# Patient Record
Sex: MALE | URBAN - METROPOLITAN AREA
[De-identification: names, ages, dates, MRNs, and addresses within clinical notes are randomized per-mention and may not be internally consistent; named-entity substitution may affect disease eponyms.]

---

## 2023-02-27 ENCOUNTER — OFFICE (OUTPATIENT)
Dept: URBAN - METROPOLITAN AREA CLINIC 27 | Facility: CLINIC | Age: 23
End: 2023-02-27

## 2023-02-27 VITALS
DIASTOLIC BLOOD PRESSURE: 77 MMHG | WEIGHT: 201 LBS | HEIGHT: 67 IN | TEMPERATURE: 98.1 F | SYSTOLIC BLOOD PRESSURE: 129 MMHG | HEART RATE: 75 BPM

## 2023-02-27 DIAGNOSIS — Z83.79 FAMILY HISTORY OF OTHER DISEASES OF THE DIGESTIVE SYSTEM: ICD-10-CM

## 2023-02-27 DIAGNOSIS — R19.4 CHANGE IN BOWEL HABIT: ICD-10-CM

## 2023-02-27 DIAGNOSIS — K92.0 HEMATEMESIS: ICD-10-CM

## 2023-02-27 DIAGNOSIS — K21.9 GASTRO-ESOPHAGEAL REFLUX DISEASE WITHOUT ESOPHAGITIS: ICD-10-CM

## 2023-02-27 DIAGNOSIS — K92.1 MELENA: ICD-10-CM

## 2023-02-27 PROCEDURE — 99244 OFF/OP CNSLTJ NEW/EST MOD 40: CPT | Performed by: INTERNAL MEDICINE

## 2023-03-24 PROBLEM — F41.1 GENERALIZED ANXIETY DISORDER: Status: ACTIVE | Noted: 2023-03-24

## 2023-03-24 PROBLEM — R30.0 DYSURIA: Status: ACTIVE | Noted: 2023-03-24

## 2023-03-24 PROBLEM — R00.2 PALPITATIONS: Status: ACTIVE | Noted: 2023-03-24

## 2023-03-24 PROBLEM — L65.9 HAIR LOSS: Status: ACTIVE | Noted: 2023-03-24

## 2023-03-24 PROBLEM — L30.9 ECZEMA: Status: ACTIVE | Noted: 2023-03-24

## 2023-03-24 PROBLEM — G47.00 INSOMNIA, PERSISTENT: Status: ACTIVE | Noted: 2023-03-24

## 2023-03-24 PROBLEM — H61.22 IMPACTED CERUMEN OF LEFT EAR: Status: ACTIVE | Noted: 2023-03-24

## 2023-03-24 PROBLEM — R53.83 FATIGUE: Status: ACTIVE | Noted: 2023-03-24

## 2023-03-24 PROBLEM — J02.9 SORE THROAT: Status: ACTIVE | Noted: 2023-03-24

## 2023-03-24 PROBLEM — R00.0 TACHYCARDIA: Status: ACTIVE | Noted: 2023-03-24

## 2023-03-24 PROBLEM — J30.9 ALLERGIC RHINITIS: Status: ACTIVE | Noted: 2023-03-24

## 2023-03-24 PROBLEM — R51.9 HEADACHE: Status: ACTIVE | Noted: 2023-03-24

## 2023-03-24 PROBLEM — K21.9 GERD (GASTROESOPHAGEAL REFLUX DISEASE): Status: ACTIVE | Noted: 2023-03-24

## 2023-03-24 PROBLEM — G47.8 SLEEP PARALYSIS: Status: ACTIVE | Noted: 2023-03-24

## 2023-03-24 PROBLEM — N34.2 URETHRITIS: Status: ACTIVE | Noted: 2023-03-24

## 2023-03-24 PROBLEM — J01.10 ACUTE FRONTAL SINUSITIS: Status: ACTIVE | Noted: 2023-03-24

## 2023-03-24 PROBLEM — L73.9 FOLLICULITIS: Status: ACTIVE | Noted: 2023-03-24

## 2023-03-24 PROBLEM — F22 PARANOIA (MULTI): Status: ACTIVE | Noted: 2023-03-24

## 2023-03-24 PROBLEM — G47.9 SLEEP DISORDER: Status: ACTIVE | Noted: 2023-03-24

## 2023-03-24 PROBLEM — J35.8 TONSIL STONE: Status: ACTIVE | Noted: 2023-03-24

## 2023-03-24 PROBLEM — T78.40XA ALLERGIC REACTION: Status: ACTIVE | Noted: 2023-03-24

## 2023-03-24 PROBLEM — R59.9 REACTIVE LYMPHADENOPATHY: Status: ACTIVE | Noted: 2023-03-24

## 2023-03-24 PROBLEM — K13.79 MOUTH PAIN: Status: ACTIVE | Noted: 2023-03-24

## 2023-03-24 RX ORDER — ESCITALOPRAM OXALATE 10 MG/1
1 TABLET ORAL DAILY
COMMUNITY
Start: 2023-02-16 | End: 2023-04-10

## 2023-03-24 RX ORDER — LORATADINE 10 MG/1
1 TABLET ORAL DAILY PRN
COMMUNITY
Start: 2021-06-09

## 2023-03-24 RX ORDER — OMEPRAZOLE 40 MG/1
1 CAPSULE, DELAYED RELEASE ORAL DAILY
COMMUNITY
Start: 2023-02-16 | End: 2023-06-27 | Stop reason: DRUGHIGH

## 2023-04-05 DIAGNOSIS — F41.1 GENERALIZED ANXIETY DISORDER: ICD-10-CM

## 2023-04-07 ENCOUNTER — AMBULATORY SURGICAL CENTER (OUTPATIENT)
Dept: URBAN - METROPOLITAN AREA SURGERY 12 | Facility: SURGERY | Age: 23
End: 2023-04-07

## 2023-04-07 ENCOUNTER — OFFICE (OUTPATIENT)
Dept: URBAN - METROPOLITAN AREA PATHOLOGY 2 | Facility: PATHOLOGY | Age: 23
End: 2023-04-07
Payer: COMMERCIAL

## 2023-04-07 VITALS
DIASTOLIC BLOOD PRESSURE: 58 MMHG | HEIGHT: 67 IN | HEART RATE: 91 BPM | SYSTOLIC BLOOD PRESSURE: 146 MMHG | OXYGEN SATURATION: 97 % | RESPIRATION RATE: 17 BRPM | HEART RATE: 101 BPM | DIASTOLIC BLOOD PRESSURE: 55 MMHG | SYSTOLIC BLOOD PRESSURE: 130 MMHG | SYSTOLIC BLOOD PRESSURE: 126 MMHG | OXYGEN SATURATION: 99 % | OXYGEN SATURATION: 95 % | HEART RATE: 81 BPM | HEART RATE: 101 BPM | SYSTOLIC BLOOD PRESSURE: 125 MMHG | HEART RATE: 86 BPM | RESPIRATION RATE: 17 BRPM | DIASTOLIC BLOOD PRESSURE: 59 MMHG | RESPIRATION RATE: 12 BRPM | RESPIRATION RATE: 12 BRPM | HEART RATE: 85 BPM | OXYGEN SATURATION: 99 % | DIASTOLIC BLOOD PRESSURE: 55 MMHG | WEIGHT: 200 LBS | OXYGEN SATURATION: 97 % | HEART RATE: 68 BPM | HEART RATE: 72 BPM | HEART RATE: 81 BPM | WEIGHT: 200 LBS | HEART RATE: 86 BPM | TEMPERATURE: 98.5 F | SYSTOLIC BLOOD PRESSURE: 125 MMHG | DIASTOLIC BLOOD PRESSURE: 59 MMHG | RESPIRATION RATE: 12 BRPM | HEART RATE: 81 BPM | RESPIRATION RATE: 15 BRPM | SYSTOLIC BLOOD PRESSURE: 130 MMHG | HEIGHT: 67 IN | HEART RATE: 83 BPM | DIASTOLIC BLOOD PRESSURE: 69 MMHG | DIASTOLIC BLOOD PRESSURE: 69 MMHG | HEART RATE: 83 BPM | HEART RATE: 72 BPM | DIASTOLIC BLOOD PRESSURE: 58 MMHG | RESPIRATION RATE: 16 BRPM | RESPIRATION RATE: 18 BRPM | RESPIRATION RATE: 18 BRPM | RESPIRATION RATE: 15 BRPM | OXYGEN SATURATION: 100 % | SYSTOLIC BLOOD PRESSURE: 108 MMHG | HEART RATE: 68 BPM | OXYGEN SATURATION: 100 % | HEART RATE: 86 BPM | OXYGEN SATURATION: 93 % | OXYGEN SATURATION: 99 % | HEART RATE: 91 BPM | SYSTOLIC BLOOD PRESSURE: 146 MMHG | RESPIRATION RATE: 16 BRPM | DIASTOLIC BLOOD PRESSURE: 69 MMHG | DIASTOLIC BLOOD PRESSURE: 49 MMHG | RESPIRATION RATE: 17 BRPM | OXYGEN SATURATION: 100 % | DIASTOLIC BLOOD PRESSURE: 58 MMHG | TEMPERATURE: 98.5 F | HEART RATE: 77 BPM | HEART RATE: 83 BPM | HEART RATE: 77 BPM | HEART RATE: 101 BPM | DIASTOLIC BLOOD PRESSURE: 71 MMHG | TEMPERATURE: 98.5 F | SYSTOLIC BLOOD PRESSURE: 134 MMHG | SYSTOLIC BLOOD PRESSURE: 134 MMHG | RESPIRATION RATE: 15 BRPM | OXYGEN SATURATION: 95 % | SYSTOLIC BLOOD PRESSURE: 108 MMHG | HEIGHT: 67 IN | OXYGEN SATURATION: 93 % | HEART RATE: 77 BPM | RESPIRATION RATE: 16 BRPM | SYSTOLIC BLOOD PRESSURE: 108 MMHG | SYSTOLIC BLOOD PRESSURE: 130 MMHG | SYSTOLIC BLOOD PRESSURE: 126 MMHG | WEIGHT: 200 LBS | DIASTOLIC BLOOD PRESSURE: 49 MMHG | RESPIRATION RATE: 18 BRPM | OXYGEN SATURATION: 93 % | DIASTOLIC BLOOD PRESSURE: 55 MMHG | RESPIRATION RATE: 14 BRPM | OXYGEN SATURATION: 97 % | HEART RATE: 72 BPM | SYSTOLIC BLOOD PRESSURE: 125 MMHG | RESPIRATION RATE: 14 BRPM | DIASTOLIC BLOOD PRESSURE: 71 MMHG | DIASTOLIC BLOOD PRESSURE: 59 MMHG | HEART RATE: 85 BPM | DIASTOLIC BLOOD PRESSURE: 49 MMHG | HEART RATE: 68 BPM | SYSTOLIC BLOOD PRESSURE: 146 MMHG | OXYGEN SATURATION: 95 % | HEART RATE: 85 BPM | HEART RATE: 91 BPM | DIASTOLIC BLOOD PRESSURE: 71 MMHG | SYSTOLIC BLOOD PRESSURE: 134 MMHG | RESPIRATION RATE: 14 BRPM | SYSTOLIC BLOOD PRESSURE: 126 MMHG

## 2023-04-07 DIAGNOSIS — K44.9 DIAPHRAGMATIC HERNIA WITHOUT OBSTRUCTION OR GANGRENE: ICD-10-CM

## 2023-04-07 DIAGNOSIS — K21.9 GASTRO-ESOPHAGEAL REFLUX DISEASE WITHOUT ESOPHAGITIS: ICD-10-CM

## 2023-04-07 DIAGNOSIS — K29.60 OTHER GASTRITIS WITHOUT BLEEDING: ICD-10-CM

## 2023-04-07 DIAGNOSIS — K20.0 EOSINOPHILIC ESOPHAGITIS: ICD-10-CM

## 2023-04-07 PROBLEM — K31.89 OTHER DISEASES OF STOMACH AND DUODENUM: Status: ACTIVE | Noted: 2023-04-07

## 2023-04-07 PROBLEM — K22.89 OTHER SPECIFIED DISEASE OF ESOPHAGUS: Status: ACTIVE | Noted: 2023-04-07

## 2023-04-07 PROCEDURE — 43239 EGD BIOPSY SINGLE/MULTIPLE: CPT | Performed by: INTERNAL MEDICINE

## 2023-04-07 PROCEDURE — 88305 TISSUE EXAM BY PATHOLOGIST: CPT | Performed by: PATHOLOGY

## 2023-04-07 PROCEDURE — 88342 IMHCHEM/IMCYTCHM 1ST ANTB: CPT | Performed by: PATHOLOGY

## 2023-04-07 PROCEDURE — 88313 SPECIAL STAINS GROUP 2: CPT | Performed by: PATHOLOGY

## 2023-04-07 RX ORDER — OMEPRAZOLE 40 MG/1
CAPSULE, DELAYED RELEASE ORAL
Qty: 180 | Refills: 3 | Status: ACTIVE
Start: 2023-04-07

## 2023-04-10 RX ORDER — ESCITALOPRAM OXALATE 10 MG/1
TABLET ORAL
Qty: 30 TABLET | Refills: 1 | Status: SHIPPED | OUTPATIENT
Start: 2023-04-10 | End: 2023-05-16 | Stop reason: SDUPTHER

## 2023-05-15 DIAGNOSIS — F41.1 GENERALIZED ANXIETY DISORDER: ICD-10-CM

## 2023-05-15 NOTE — TELEPHONE ENCOUNTER
Pt called ldm on machine requesting Rx refill on   Escitalopram 10 mg   Send to [facility]   Last seen 1/13/23

## 2023-05-16 RX ORDER — ESCITALOPRAM OXALATE 10 MG/1
10 TABLET ORAL DAILY
Qty: 30 TABLET | Refills: 1 | Status: SHIPPED | OUTPATIENT
Start: 2023-05-16 | End: 2023-06-27 | Stop reason: SDUPTHER

## 2023-06-27 ENCOUNTER — OFFICE VISIT (OUTPATIENT)
Dept: PRIMARY CARE | Facility: CLINIC | Age: 23
End: 2023-06-27
Payer: COMMERCIAL

## 2023-06-27 VITALS
BODY MASS INDEX: 33.59 KG/M2 | WEIGHT: 209 LBS | SYSTOLIC BLOOD PRESSURE: 100 MMHG | HEIGHT: 66 IN | DIASTOLIC BLOOD PRESSURE: 60 MMHG | TEMPERATURE: 97.5 F | HEART RATE: 80 BPM | RESPIRATION RATE: 16 BRPM

## 2023-06-27 DIAGNOSIS — F41.1 GENERALIZED ANXIETY DISORDER: ICD-10-CM

## 2023-06-27 PROBLEM — L30.9 ECZEMA: Status: RESOLVED | Noted: 2023-03-24 | Resolved: 2023-06-27

## 2023-06-27 PROBLEM — R30.0 DYSURIA: Status: RESOLVED | Noted: 2023-03-24 | Resolved: 2023-06-27

## 2023-06-27 PROBLEM — K13.79 MOUTH PAIN: Status: RESOLVED | Noted: 2023-03-24 | Resolved: 2023-06-27

## 2023-06-27 PROBLEM — J01.10 ACUTE FRONTAL SINUSITIS: Status: RESOLVED | Noted: 2023-03-24 | Resolved: 2023-06-27

## 2023-06-27 PROBLEM — J35.8 TONSIL STONE: Status: RESOLVED | Noted: 2023-03-24 | Resolved: 2023-06-27

## 2023-06-27 PROBLEM — L73.9 FOLLICULITIS: Status: RESOLVED | Noted: 2023-03-24 | Resolved: 2023-06-27

## 2023-06-27 PROBLEM — T78.40XA ALLERGIC REACTION: Status: RESOLVED | Noted: 2023-03-24 | Resolved: 2023-06-27

## 2023-06-27 PROBLEM — R00.2 PALPITATIONS: Status: RESOLVED | Noted: 2023-03-24 | Resolved: 2023-06-27

## 2023-06-27 PROBLEM — H61.22 IMPACTED CERUMEN OF LEFT EAR: Status: RESOLVED | Noted: 2023-03-24 | Resolved: 2023-06-27

## 2023-06-27 PROBLEM — R59.9 REACTIVE LYMPHADENOPATHY: Status: RESOLVED | Noted: 2023-03-24 | Resolved: 2023-06-27

## 2023-06-27 PROBLEM — N34.2 URETHRITIS: Status: RESOLVED | Noted: 2023-03-24 | Resolved: 2023-06-27

## 2023-06-27 PROBLEM — J02.9 SORE THROAT: Status: RESOLVED | Noted: 2023-03-24 | Resolved: 2023-06-27

## 2023-06-27 PROCEDURE — 1036F TOBACCO NON-USER: CPT | Performed by: INTERNAL MEDICINE

## 2023-06-27 PROCEDURE — 99213 OFFICE O/P EST LOW 20 MIN: CPT | Performed by: INTERNAL MEDICINE

## 2023-06-27 RX ORDER — ESCITALOPRAM OXALATE 10 MG/1
10 TABLET ORAL DAILY
Qty: 90 TABLET | Refills: 1 | Status: SHIPPED | OUTPATIENT
Start: 2023-06-27

## 2023-06-27 RX ORDER — OMEPRAZOLE 40 MG/1
40 CAPSULE, DELAYED RELEASE ORAL
Qty: 60 CAPSULE | Refills: 0 | Status: SHIPPED | OUTPATIENT
Start: 2023-06-27

## 2023-06-27 ASSESSMENT — PATIENT HEALTH QUESTIONNAIRE - PHQ9
1. LITTLE INTEREST OR PLEASURE IN DOING THINGS: NOT AT ALL
2. FEELING DOWN, DEPRESSED OR HOPELESS: NOT AT ALL
SUM OF ALL RESPONSES TO PHQ9 QUESTIONS 1 AND 2: 0

## 2023-06-27 ASSESSMENT — COLUMBIA-SUICIDE SEVERITY RATING SCALE - C-SSRS
1. IN THE PAST MONTH, HAVE YOU WISHED YOU WERE DEAD OR WISHED YOU COULD GO TO SLEEP AND NOT WAKE UP?: NO
6. HAVE YOU EVER DONE ANYTHING, STARTED TO DO ANYTHING, OR PREPARED TO DO ANYTHING TO END YOUR LIFE?: NO
2. HAVE YOU ACTUALLY HAD ANY THOUGHTS OF KILLING YOURSELF?: NO

## 2023-06-27 ASSESSMENT — ENCOUNTER SYMPTOMS: DEPRESSION: 0

## 2023-06-27 NOTE — PROGRESS NOTES
"Subjective   Patient ID: Sudhir Olvera is a 23 y.o. male who presents for Med Refill and Follow-up.  HPI  Patient presents today for follow-up.  He is on Lexapro 10 mg.  Its been working very well for him is helping to control his anxiety and obsessive thoughts.  He is taking his allergy medicine and his Prilosec.  He has a history of acid reflux he is now on Prilosec twice a day.  He gets occasional reflux at bedtime.  We did have a long discussion about sleeping elevated and taking advantage of gravity when he sleeping to keep to keep his reflux under control.  His heartburn is better.  He is very happy on his E citalopram he states his moods are controlled he no longer stresses he is got a promotion at work and he is doing very well.    On health maintenance grounds he has stopped vaping he does not smoke he drinks only about 3 beers on 1 or 2 days of the weekends and he has greatly decreased his caffeine intake but he does still drink some monster beverages once a day.  He is feeling well he is quite happy where he is at    Review of Systems  Review of systems was performed and is otherwise negative except as noted in HPI.  Objective   /60   Pulse 80   Temp 36.4 °C (97.5 °F)   Resp 16   Ht 1.676 m (5' 6\")   Wt 94.8 kg (209 lb)   BMI 33.73 kg/m²    Physical Exam  HEENT is normal  Lungs clear bilaterally  Heart regular rate and rhythm no murmurs  Abdomen is benign  Lower extremities no edema  Assessment/Plan   Diagnoses and all orders for this visit:  Generalized anxiety disorder  -     escitalopram (Lexapro) 10 mg tablet; Take 1 tablet (10 mg) by mouth once daily.  Other orders  -     omeprazole (PriLOSEC) 40 mg DR capsule; Take 1 capsule (40 mg) by mouth 2 times a day before meals.    Call with issues  Follow-up with me in 6 months  That will be as physical  He will need blood work before that appointment  Jennifer Mckeon MD   "

## 2025-06-10 ENCOUNTER — TELEPHONE (OUTPATIENT)
Dept: PRIMARY CARE | Facility: CLINIC | Age: 25
End: 2025-06-10

## 2025-06-10 ENCOUNTER — OFFICE VISIT (OUTPATIENT)
Dept: PRIMARY CARE | Facility: CLINIC | Age: 25
End: 2025-06-10
Payer: COMMERCIAL

## 2025-06-10 VITALS
HEART RATE: 79 BPM | DIASTOLIC BLOOD PRESSURE: 70 MMHG | BODY MASS INDEX: 32.13 KG/M2 | TEMPERATURE: 98.3 F | RESPIRATION RATE: 18 BRPM | OXYGEN SATURATION: 96 % | HEIGHT: 66 IN | SYSTOLIC BLOOD PRESSURE: 120 MMHG | WEIGHT: 199.9 LBS

## 2025-06-10 DIAGNOSIS — H60.502 ACUTE OTITIS EXTERNA OF LEFT EAR, UNSPECIFIED TYPE: Primary | ICD-10-CM

## 2025-06-10 PROCEDURE — 1036F TOBACCO NON-USER: CPT | Performed by: FAMILY MEDICINE

## 2025-06-10 PROCEDURE — 99213 OFFICE O/P EST LOW 20 MIN: CPT | Performed by: FAMILY MEDICINE

## 2025-06-10 PROCEDURE — 3008F BODY MASS INDEX DOCD: CPT | Performed by: FAMILY MEDICINE

## 2025-06-10 RX ORDER — NEOMYCIN SULFATE, POLYMYXIN B SULFATE, HYDROCORTISONE 3.5; 10000; 1 MG/ML; [USP'U]/ML; MG/ML
2 SOLUTION/ DROPS AURICULAR (OTIC) 4 TIMES DAILY
Qty: 10 ML | Refills: 0 | Status: SHIPPED | OUTPATIENT
Start: 2025-06-10 | End: 2025-06-17

## 2025-06-10 ASSESSMENT — ENCOUNTER SYMPTOMS
OCCASIONAL FEELINGS OF UNSTEADINESS: 0
LOSS OF SENSATION IN FEET: 0
DEPRESSION: 0

## 2025-06-10 ASSESSMENT — PATIENT HEALTH QUESTIONNAIRE - PHQ9
SUM OF ALL RESPONSES TO PHQ9 QUESTIONS 1 AND 2: 0
1. LITTLE INTEREST OR PLEASURE IN DOING THINGS: NOT AT ALL
2. FEELING DOWN, DEPRESSED OR HOPELESS: NOT AT ALL

## 2025-06-10 NOTE — TELEPHONE ENCOUNTER
Pharmacy called asking if you could send in a different Abx for this pt.  The ear drops that you prescribed are to expensive for him.  Please send new script to [facility] in [place].

## 2025-06-10 NOTE — PROGRESS NOTES
"Subjective   Patient ID: Sudhir Olvera is a 24 y.o. male who presents for Earache (EPV, C/O Left Ear pain & swelling x 3 days).    History of Present Illness  Sudhir Olvera is a 24 year old male who presents with swelling and pain in the left ear.    He has experienced swelling around the outer ring of his left ear, which has worsened over the past two to three days. The ear canal is swollen shut, and he experiences pain in the ear itself, as well as in the jaw area adjacent to the ear.    No respiratory symptoms such as sore throat or congestion. He has not been swimming or traveling recently. He has not taken any ibuprofen for the pain.    No throat pain, facial pain beyond the jaw area, or vision problems. The right ear is asymptomatic.    Objective     /70 (BP Location: Left arm, Patient Position: Sitting)   Pulse 79   Temp 36.8 °C (98.3 °F) (Oral)   Resp 18   Ht 1.676 m (5' 6\")   Wt 90.7 kg (199 lb 14.4 oz)   SpO2 96%   BMI 32.26 kg/m²      Physical Exam  Alert, well-appearing.    HEENT: OP clear. Sclera white. Pupils equal and round. Right ear clear. Left ear canal erythematous and swollen and tender. Left TM clear.    Neck: Supple. No palpable masses or LAD.    CVS: RRR, no murmurs.     Respiratory: Clear and equal breath sounds.    Assessment/Plan     Diagnoses and all orders for this visit:  Acute otitis externa of left ear, unspecified type  -     ciprofloxacin-hydrocortisone (Cipro HC Otic) otic suspension; Administer 3 drops into the left ear 2 times a day for 7 days.    Assessment & Plan  Otitis Externa  - Prescribe antibiotic ear drops three times daily.  - Advise ibuprofen, three tablets, two to three times daily, for swelling and pain.  - Instruct on proper administration of ear drops, including lying down to allow drops to settle in.      Meena Holt MD    This medical note was created with the assistance of artificial intelligence (AI) for documentation purposes. The content " has been reviewed and confirmed by the healthcare provider for accuracy and completeness. Patient consented to the use of audio recording and use of AI during their visit.

## 2025-07-31 ENCOUNTER — TELEPHONE (OUTPATIENT)
Dept: PRIMARY CARE | Facility: CLINIC | Age: 25
End: 2025-07-31
Payer: COMMERCIAL

## 2025-07-31 NOTE — TELEPHONE ENCOUNTER
Patient called back & he was very frustrated (not mad at us). He stated he's going to go to ER & still come out of there w/no answers & a big bill. It's hard when you can't get in to see your doctor, healthcare has gone down hill. I did tell patient message was sent to doctor & she stated go to ER.

## 2025-07-31 NOTE — TELEPHONE ENCOUNTER
Pt called requesting an appointment.  He said for the last month or 2 he has been startled awake in the middle of the night.   He's feeling like his heart is skipping beats and then gets really rapid and at that point he's up walking around with anxiety.    He said it's happened all week.      After discussing this with Giselle I advised him to go to the ER.  He said he will go to the ER if it happens again.  Please advise

## 2025-08-13 ENCOUNTER — APPOINTMENT (OUTPATIENT)
Dept: PRIMARY CARE | Facility: CLINIC | Age: 25
End: 2025-08-13
Payer: COMMERCIAL

## 2025-08-15 ENCOUNTER — OFFICE VISIT (OUTPATIENT)
Dept: PRIMARY CARE | Facility: CLINIC | Age: 25
End: 2025-08-15
Payer: COMMERCIAL

## 2025-08-15 VITALS
SYSTOLIC BLOOD PRESSURE: 116 MMHG | DIASTOLIC BLOOD PRESSURE: 74 MMHG | WEIGHT: 204 LBS | BODY MASS INDEX: 32.78 KG/M2 | HEART RATE: 76 BPM | OXYGEN SATURATION: 98 % | TEMPERATURE: 97.7 F | HEIGHT: 66 IN

## 2025-08-15 DIAGNOSIS — G47.34 IDIOPATHIC SLEEP RELATED NONOBSTRUCTIVE ALVEOLAR HYPOVENTILATION: ICD-10-CM

## 2025-08-15 DIAGNOSIS — K21.9 GASTROESOPHAGEAL REFLUX DISEASE WITHOUT ESOPHAGITIS: Primary | ICD-10-CM

## 2025-08-15 DIAGNOSIS — F41.9 ANXIETY: ICD-10-CM

## 2025-08-15 PROCEDURE — 99214 OFFICE O/P EST MOD 30 MIN: CPT | Performed by: INTERNAL MEDICINE

## 2025-08-15 PROCEDURE — 3008F BODY MASS INDEX DOCD: CPT | Performed by: INTERNAL MEDICINE

## 2025-08-15 RX ORDER — OMEPRAZOLE 40 MG/1
40 CAPSULE, DELAYED RELEASE ORAL
Qty: 30 CAPSULE | Refills: 3 | Status: SHIPPED | OUTPATIENT
Start: 2025-08-15

## 2025-08-15 RX ORDER — DULOXETIN HYDROCHLORIDE 30 MG/1
30 CAPSULE, DELAYED RELEASE ORAL DAILY
Qty: 30 CAPSULE | Refills: 3 | Status: SHIPPED | OUTPATIENT
Start: 2025-08-15 | End: 2026-08-15

## 2025-08-15 ASSESSMENT — ENCOUNTER SYMPTOMS
LOSS OF SENSATION IN FEET: 0
OCCASIONAL FEELINGS OF UNSTEADINESS: 0
DEPRESSION: 0

## 2025-08-15 ASSESSMENT — PATIENT HEALTH QUESTIONNAIRE - PHQ9
2. FEELING DOWN, DEPRESSED OR HOPELESS: NOT AT ALL
SUM OF ALL RESPONSES TO PHQ9 QUESTIONS 1 AND 2: 0
1. LITTLE INTEREST OR PLEASURE IN DOING THINGS: NOT AT ALL
1. LITTLE INTEREST OR PLEASURE IN DOING THINGS: SEVERAL DAYS
2. FEELING DOWN, DEPRESSED OR HOPELESS: SEVERAL DAYS
SUM OF ALL RESPONSES TO PHQ9 QUESTIONS 1 AND 2: 2

## 2025-08-18 ENCOUNTER — APPOINTMENT (OUTPATIENT)
Dept: PRIMARY CARE | Facility: CLINIC | Age: 25
End: 2025-08-18
Payer: COMMERCIAL

## 2025-08-21 PROBLEM — G47.30 SLEEP DISORDER BREATHING: Status: ACTIVE | Noted: 2025-08-21

## 2025-08-21 PROBLEM — E66.811 OBESITY (BMI 30.0-34.9): Status: ACTIVE | Noted: 2025-08-21

## 2025-08-22 ENCOUNTER — APPOINTMENT (OUTPATIENT)
Facility: CLINIC | Age: 25
End: 2025-08-22
Payer: COMMERCIAL

## 2025-08-22 VITALS — BODY MASS INDEX: 32.02 KG/M2 | WEIGHT: 204 LBS | HEIGHT: 67 IN

## 2025-08-22 DIAGNOSIS — G47.30 SLEEP DISORDER BREATHING: Primary | ICD-10-CM

## 2025-08-22 DIAGNOSIS — G47.8 SLEEP PARALYSIS: ICD-10-CM

## 2025-08-22 DIAGNOSIS — Z78.9 NONSMOKER: ICD-10-CM

## 2025-08-22 DIAGNOSIS — E66.811 OBESITY (BMI 30.0-34.9): ICD-10-CM

## 2025-08-22 PROCEDURE — 99204 OFFICE O/P NEW MOD 45 MIN: CPT | Performed by: NURSE PRACTITIONER

## 2025-08-22 PROCEDURE — 3008F BODY MASS INDEX DOCD: CPT | Performed by: NURSE PRACTITIONER

## 2025-08-22 ASSESSMENT — SLEEP AND FATIGUE QUESTIONNAIRES
WORRIED_DISTRESSED_DUE_TO_SLEEP: VERY MUCH NOTICEABLE
SITING INACTIVE IN A PUBLIC PLACE LIKE A CLASS ROOM OR A MOVIE THEATER: WOULD NEVER DOZE
DIFFICULTY_STAYING_ASLEEP: VERY SEVERE
ESS TOTAL SCORE: 1
HOW LIKELY ARE YOU TO NOD OFF OR FALL ASLEEP WHEN YOU ARE A PASSENGER IN A CAR FOR AN HOUR WITHOUT A BREAK: WOULD NEVER DOZE
WAKING_TOO_EARLY: MODERATE
HOW LIKELY ARE YOU TO NOD OFF OR FALL ASLEEP WHILE SITTING AND TALKING TO SOMEONE: WOULD NEVER DOZE
HOW LIKELY ARE YOU TO NOD OFF OR FALL ASLEEP WHILE SITTING QUIETLY AFTER LUNCH WITHOUT ALCOHOL: WOULD NEVER DOZE
HOW LIKELY ARE YOU TO NOD OFF OR FALL ASLEEP WHILE SITTING INACTIVE IN A PUBLIC PLACE: WOULD NEVER DOZE
HOW LIKELY ARE YOU TO NOD OFF OR FALL ASLEEP IN A CAR, WHILE STOPPED FOR A FEW MINUTES IN TRAFFIC: WOULD NEVER DOZE
DIFFICULTY_FALLING_ASLEEP: VERY SEVERE
HOW LIKELY ARE YOU TO NOD OFF OR FALL ASLEEP WHILE WATCHING TV: WOULD NEVER DOZE
HOW LIKELY ARE YOU TO NOD OFF OR FALL ASLEEP IN A CAR, WHILE STOPPED FOR A FEW MINUTES IN TRAFFIC: WOULD NEVER DOZE
SATISFACTION_WITH_CURRENT_SLEEP_PATTERN: VERY DISSATISFIED
SLEEP_PROBLEM_INTERFERES_DAILY_ACTIVITIES: VERY MUCH NOTICEABLE
HOW LIKELY ARE YOU TO NOD OFF OR FALL ASLEEP WHILE SITTING AND READING: WOULD NEVER DOZE
SLEEP_PROBLEM_NOTICEABLE_TO_OTHERS: VERY MUCH NOTICEABLE
HOW LIKELY ARE YOU TO NOD OFF OR FALL ASLEEP WHILE LYING DOWN TO REST IN THE AFTERNOON WHEN CIRCUMSTANCES PERMIT: SLIGHT CHANCE OF DOZING
ESS-CHAD TOTAL SCORE: 1

## 2025-08-25 ENCOUNTER — TELEPHONE (OUTPATIENT)
Dept: PULMONOLOGY | Facility: CLINIC | Age: 25
End: 2025-08-25
Payer: COMMERCIAL

## 2025-08-26 DIAGNOSIS — G47.30 SLEEP DISORDER BREATHING: Primary | ICD-10-CM

## 2025-08-27 ENCOUNTER — TELEPHONE (OUTPATIENT)
Dept: PRIMARY CARE | Facility: CLINIC | Age: 25
End: 2025-08-27
Payer: COMMERCIAL

## 2025-08-29 ENCOUNTER — CLINICAL SUPPORT (OUTPATIENT)
Dept: SLEEP MEDICINE | Facility: HOSPITAL | Age: 25
End: 2025-08-29
Payer: COMMERCIAL

## 2025-08-29 VITALS — WEIGHT: 204 LBS | BODY MASS INDEX: 32.78 KG/M2 | HEIGHT: 66 IN

## 2025-08-29 DIAGNOSIS — G47.30 SLEEP DISORDER BREATHING: ICD-10-CM

## 2025-08-29 PROCEDURE — 9420000001 HC RT PATIENT EDUCATION 5 MIN

## 2025-08-29 ASSESSMENT — SLEEP AND FATIGUE QUESTIONNAIRES
HOW LIKELY ARE YOU TO NOD OFF OR FALL ASLEEP WHILE SITTING AND TALKING TO SOMEONE: WOULD NEVER DOZE
SITING INACTIVE IN A PUBLIC PLACE LIKE A CLASS ROOM OR A MOVIE THEATER: WOULD NEVER DOZE
HOW LIKELY ARE YOU TO NOD OFF OR FALL ASLEEP WHEN YOU ARE A PASSENGER IN A CAR FOR AN HOUR WITHOUT A BREAK: SLIGHT CHANCE OF DOZING
HOW LIKELY ARE YOU TO NOD OFF OR FALL ASLEEP WHILE SITTING QUIETLY AFTER LUNCH WITHOUT ALCOHOL: WOULD NEVER DOZE
HOW LIKELY ARE YOU TO NOD OFF OR FALL ASLEEP WHILE WATCHING TV: SLIGHT CHANCE OF DOZING
ESS-CHAD TOTAL SCORE: 5
HOW LIKELY ARE YOU TO NOD OFF OR FALL ASLEEP WHILE LYING DOWN TO REST IN THE AFTERNOON WHEN CIRCUMSTANCES PERMIT: MODERATE CHANCE OF DOZING
HOW LIKELY ARE YOU TO NOD OFF OR FALL ASLEEP WHILE SITTING AND READING: WOULD NEVER DOZE
HOW LIKELY ARE YOU TO NOD OFF OR FALL ASLEEP IN A CAR, WHILE STOPPED FOR A FEW MINUTES IN TRAFFIC: SLIGHT CHANCE OF DOZING

## 2025-09-02 ENCOUNTER — TELEPHONE (OUTPATIENT)
Dept: PULMONOLOGY | Facility: CLINIC | Age: 25
End: 2025-09-02
Payer: COMMERCIAL

## 2025-09-03 ENCOUNTER — TELEPHONE (OUTPATIENT)
Dept: SLEEP MEDICINE | Facility: HOSPITAL | Age: 25
End: 2025-09-03
Payer: COMMERCIAL

## 2025-10-31 ENCOUNTER — APPOINTMENT (OUTPATIENT)
Facility: CLINIC | Age: 25
End: 2025-10-31
Payer: COMMERCIAL

## 2025-12-15 ENCOUNTER — APPOINTMENT (OUTPATIENT)
Dept: PRIMARY CARE | Facility: CLINIC | Age: 25
End: 2025-12-15
Payer: COMMERCIAL